# Patient Record
Sex: MALE | Race: WHITE | NOT HISPANIC OR LATINO | Employment: OTHER | URBAN - METROPOLITAN AREA
[De-identification: names, ages, dates, MRNs, and addresses within clinical notes are randomized per-mention and may not be internally consistent; named-entity substitution may affect disease eponyms.]

---

## 2017-05-01 ENCOUNTER — HISTORICAL (OUTPATIENT)
Dept: ADMINISTRATIVE | Facility: HOSPITAL | Age: 27
End: 2017-05-01

## 2017-10-03 ENCOUNTER — HISTORICAL (OUTPATIENT)
Dept: ADMINISTRATIVE | Facility: HOSPITAL | Age: 27
End: 2017-10-03

## 2017-10-03 LAB
ABS NEUT (OLG): 1.26 X10(3)/MCL (ref 2.1–9.2)
ALBUMIN SERPL-MCNC: 4 GM/DL (ref 3.4–5)
ALBUMIN/GLOB SERPL: 1 {RATIO}
ALP SERPL-CCNC: 36 UNIT/L (ref 50–136)
ALT SERPL-CCNC: 21 UNIT/L (ref 12–78)
AST SERPL-CCNC: 13 UNIT/L (ref 15–37)
BILIRUB SERPL-MCNC: 0.4 MG/DL (ref 0.2–1)
BILIRUBIN DIRECT+TOT PNL SERPL-MCNC: 0.1 MG/DL (ref 0–0.2)
BILIRUBIN DIRECT+TOT PNL SERPL-MCNC: 0.3 MG/DL (ref 0–0.8)
BUN SERPL-MCNC: 6 MG/DL (ref 7–18)
CALCIUM SERPL-MCNC: 9.1 MG/DL (ref 8.5–10.1)
CHLORIDE SERPL-SCNC: 104 MMOL/L (ref 98–107)
CHOLEST SERPL-MCNC: 164 MG/DL (ref 0–200)
CHOLEST/HDLC SERPL: 4.2 {RATIO} (ref 0–5)
CO2 SERPL-SCNC: 27 MMOL/L (ref 21–32)
CREAT SERPL-MCNC: 0.77 MG/DL (ref 0.7–1.3)
EOSINOPHIL NFR BLD MANUAL: 1 % (ref 0–8)
ERYTHROCYTE [DISTWIDTH] IN BLOOD BY AUTOMATED COUNT: 12 % (ref 11.5–17)
GLOBULIN SER-MCNC: 4 GM/DL (ref 2.4–3.5)
GLUCOSE SERPL-MCNC: 86 MG/DL (ref 74–106)
HCT VFR BLD AUTO: 43.5 % (ref 42–52)
HDLC SERPL-MCNC: 39 MG/DL (ref 35–60)
HGB BLD-MCNC: 14.7 GM/DL (ref 14–18)
LDLC SERPL CALC-MCNC: 82 MG/DL (ref 0–129)
LYMPHOCYTES NFR BLD MANUAL: 2 %
LYMPHOCYTES NFR BLD MANUAL: 50 % (ref 13–40)
MCH RBC QN AUTO: 29.8 PG (ref 27–31)
MCHC RBC AUTO-ENTMCNC: 33.8 GM/DL (ref 33–36)
MCV RBC AUTO: 88.1 FL (ref 80–94)
MONOCYTES NFR BLD MANUAL: 11 % (ref 2–11)
NEUTROPHILS NFR BLD MANUAL: 36 % (ref 47–80)
PLATELET # BLD AUTO: 170 X10(3)/MCL (ref 130–400)
PLATELET # BLD EST: NORMAL 10*3/UL
PMV BLD AUTO: 8.9 FL (ref 7.4–10.4)
POTASSIUM SERPL-SCNC: 4.6 MMOL/L (ref 3.5–5.1)
PROT SERPL-MCNC: 8 GM/DL (ref 6.4–8.2)
RBC # BLD AUTO: 4.94 X10(6)/MCL (ref 4.7–6.1)
SODIUM SERPL-SCNC: 139 MMOL/L (ref 136–145)
T3FREE SERPL-MCNC: 3.29 PG/ML (ref 2.18–3.98)
T4 FREE SERPL-MCNC: 1 NG/DL (ref 0.76–1.46)
TRIGL SERPL-MCNC: 217 MG/DL (ref 30–150)
TSH SERPL-ACNC: 3.58 MIU/ML (ref 0.36–3.74)
VLDLC SERPL CALC-MCNC: 43 MG/DL
WBC # SPEC AUTO: 3.9 X10(3)/MCL (ref 4.5–11.5)

## 2018-01-10 ENCOUNTER — HOSPITAL ENCOUNTER (EMERGENCY)
Dept: HOSPITAL 25 - UCEAST | Age: 28
Discharge: HOME | End: 2018-01-10
Payer: COMMERCIAL

## 2018-01-10 VITALS — DIASTOLIC BLOOD PRESSURE: 77 MMHG | SYSTOLIC BLOOD PRESSURE: 140 MMHG

## 2018-01-10 DIAGNOSIS — J40: Primary | ICD-10-CM

## 2018-01-10 DIAGNOSIS — Z88.0: ICD-10-CM

## 2018-01-10 DIAGNOSIS — Z77.22: ICD-10-CM

## 2018-01-10 DIAGNOSIS — Z11.4: ICD-10-CM

## 2018-01-10 DIAGNOSIS — Z11.3: ICD-10-CM

## 2018-01-10 PROCEDURE — 87340 HEPATITIS B SURFACE AG IA: CPT

## 2018-01-10 PROCEDURE — 87491 CHLMYD TRACH DNA AMP PROBE: CPT

## 2018-01-10 PROCEDURE — 36415 COLL VENOUS BLD VENIPUNCTURE: CPT

## 2018-01-10 PROCEDURE — 86592 SYPHILIS TEST NON-TREP QUAL: CPT

## 2018-01-10 PROCEDURE — G0463 HOSPITAL OUTPT CLINIC VISIT: HCPCS

## 2018-01-10 PROCEDURE — 99212 OFFICE O/P EST SF 10 MIN: CPT

## 2018-01-10 PROCEDURE — 86706 HEP B SURFACE ANTIBODY: CPT

## 2018-01-10 PROCEDURE — 86703 HIV-1/HIV-2 1 RESULT ANTBDY: CPT

## 2018-01-10 PROCEDURE — 87591 N.GONORRHOEAE DNA AMP PROB: CPT

## 2018-01-10 PROCEDURE — 81003 URINALYSIS AUTO W/O SCOPE: CPT

## 2018-01-10 PROCEDURE — 86803 HEPATITIS C AB TEST: CPT

## 2018-01-10 NOTE — UC
Respiratory Complaint HPI





- HPI Summary


HPI Summary: 





chest burning with cough and inspiration for 2 days, no fevers chills nausea 

vomiting or SOB. Is also interested in STD testing





- History of Current Complaint


Chief Complaint: UCRespiratory


Stated Complaint: RESP COMPLAINT


Time Seen by Provider: 01/10/18 17:45


Hx Obtained From: Patient


Onset/Duration: Sudden Onset, Lasting Days - 2, Still Present


Timing: Constant


Severity Initially: Mild


Severity Currently: Mild


Character: Cough: Nonproductive


Aggravating Factors: Deep Breaths


Alleviating Factors: Nothing


Associated Signs And Symptoms: Positive: Pleuritic Chest Pain, URI





- Allergies/Home Medications


Allergies/Adverse Reactions: 


 Allergies











Allergy/AdvReac Type Severity Reaction Status Date / Time


 


Penicillins Allergy Severe Anaphylatic Verified 01/10/18 16:44





   Shock  














PMH/Surg Hx/FS Hx/Imm Hx


Previously Healthy: Yes


Other History Of: 


   Negative For: HIV, Hepatitis B, Hepatitis C





- Surgical History


Surgical History: Yes


Surgery Procedure, Year, and Place: tubes in ears; testicular surgery





- Family History


Known Family History: Positive: None


   Negative: Cardiac Disease, Hypertension





- Social History


Occupation: Employed Full-time


Lives: With Family


Alcohol Use: None


Substance Use Type: None


Smoking Status (MU): Never Smoked Tobacco


Household Exposure Type: Cigarettes





- Immunization History


Most Recent Influenza Vaccination: 2015


Most Recent Tetanus Shot: UTD





Review of Systems


Constitutional: Negative


Skin: Negative


Eyes: Negative


ENT: Negative


Respiratory: Cough


Cardiovascular: Negative


Gastrointestinal: Negative


Genitourinary: Negative


Motor: Negative


Neurovascular: Negative


Musculoskeletal: Negative


Neurological: Negative


Psychological: Negative


Is Patient Immunocompromised?: No


All Other Systems Reviewed And Are Negative: Yes





Physical Exam


Triage Information Reviewed: Yes


Appearance: Well-Appearing, No Pain Distress, Well-Nourished


Vital Signs: 


 Initial Vital Signs











Temp  98.6 F   01/10/18 16:38


 


Pulse  104   01/10/18 16:38


 


Resp  15   01/10/18 16:38


 


BP  140/77   01/10/18 16:38


 


Pulse Ox  99   01/10/18 16:38











Vital Signs Reviewed: Yes


Eye Exam: Normal


Eyes: Positive: Conjunctiva Clear


ENT Exam: Normal


ENT: Positive: Normal ENT inspection, Hearing grossly normal, Pharynx normal, 

TMs normal, Uvula midline.  Negative: Nasal congestion, Nasal drainage, 

Tonsillar swelling, Tonsillar exudate, Trismus, Muffled voice, Hoarse voice, 

Dental tenderness, Sinus tenderness


Dental Exam: Normal


Neck exam: Normal


Neck: Positive: Supple, Nontender, No Lymphadenopathy


Respiratory Exam: Normal


Respiratory: Positive: Chest non-tender, Lungs clear, Normal breath sounds, No 

respiratory distress, No accessory muscle use


Cardiovascular Exam: Normal


Cardiovascular: Positive: RRR, No Murmur, Pulses Normal, Brisk Capillary Refill


Musculoskeletal Exam: Normal


Musculoskeletal: Positive: Strength Intact, ROM Intact, No Edema


Neurological Exam: Normal


Neurological: Positive: Alert, Muscle Tone Normal


Psychological Exam: Normal


Skin Exam: Normal





UC Diagnostic Evaluation





- Laboratory


O2 Sat by Pulse Oximetry: 99





Respiratory Course/Dx





- Course


Course Of Treatment: Lab studies for STD screening, albuterol, zithromax and 

prednisone for Bronchitis, follow with pcp





- Differential Dx/Diagnosis


Provider Diagnoses: Bronchitis, STD screening





Discharge





- Discharge Plan


Condition: Stable


Disposition: HOME


Prescriptions: 


Albuterol HFA INHALER* [Ventolin HFA Inhaler*] 2 puff INH Q4H PRN #1 mdi


 PRN Reason: cough/chest tightness


Azithromycin TAB* [Zithromax TAB (Z-HASMUKH) 250 mg #6 tabs] 2 tab PO .TODAY, THEN 

1 DAILY #1 hasmukh


predniSONE TAB* [Deltasone TAB*] 50 mg PO DAILY #4 tab


Patient Education Materials:  Sexually Transmitted Diseases (ED), Acute 

Bronchitis (ED), Hypertension (ED)


Referrals: 


Bart Rogel MD [Primary Care Provider] - 1 Week

## 2018-01-12 NOTE — UC
- Progress Note


Progress Note: 





please call patient--notify patient he is positive for Chlamydia   will be 

treated with a one time dose of 1,000 mg of Zithromax--he must take all at once-

--no sexual contact for 10 days, any other he has had sexual contact with 

should also be treated---We can provide expedited partner treatment--But I 

cannot call them in he must come up here to get the scripts please find out how 

many he will need please---Meds have been called in to pharmacy documented





Course/Dx





- Course


Course Of Treatment: Lab studies for STD screening, albuterol, zithromax and 

prednisone for Bronchitis, follow with pcp

## 2018-01-18 ENCOUNTER — HOSPITAL ENCOUNTER (EMERGENCY)
Dept: HOSPITAL 25 - UCEAST | Age: 28
Discharge: HOME | End: 2018-01-18
Payer: MEDICAID

## 2018-01-18 VITALS — SYSTOLIC BLOOD PRESSURE: 146 MMHG | DIASTOLIC BLOOD PRESSURE: 86 MMHG

## 2018-01-18 DIAGNOSIS — S05.02XA: Primary | ICD-10-CM
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
## 2018-01-18 PROCEDURE — G0463 HOSPITAL OUTPT CLINIC VISIT: HCPCS

## 2018-01-18 PROCEDURE — 99213 OFFICE O/P EST LOW 20 MIN: CPT

## 2018-01-18 NOTE — UC
Eye Complaint HPI





- HPI Summary


HPI Summary: 





28 yo male awoke this am and noted left eye redness and photophobia


tearing


no purlent d/c


no trauma


lower lid FB sensation





- History of Current Complaint


Chief Complaint: UCEye


Stated Complaint: EYE COMPLAINT


Time Seen by Provider: 01/18/18 21:24


Hx Obtained From: Patient


Onset/Duration: Gradual Onset, Lasting Hours


Timing: Constant


Severity Initially: Moderate


Severity Currently: Moderate


Pain Intensity: 6


Pain Scale Used: 0-10 Numeric


Location of Injury: Conjunctiva


Associated Signs And Symptoms: Positive: Photophobia, Drainage (Clear)


Eyes: 


  __________________________














  __________________________





 1 - corneal abrasion








- Risk Factors


Penetrating Injury Risk Factor: Negative


Globe Rupture Risk Factors: Negative


Acute Glaucoma Risk Factors: Negative


Optic Artery Occlusion Risk Factors: Negative





- Allergies/Home Medications


Allergies/Adverse Reactions: 


 Allergies











Allergy/AdvReac Type Severity Reaction Status Date / Time


 


Penicillins Allergy Severe Anaphylatic Verified 01/18/18 20:39





   Shock  














PMH/Surg Hx/FS Hx/Imm Hx


Previously Healthy: Yes


Other History Of: 


   Negative For: HIV, Hepatitis B, Hepatitis C





- Surgical History


Surgical History: Yes


Surgery Procedure, Year, and Place: tubes in ears; testicular surgery





- Family History


Known Family History: 


   Negative: Cardiac Disease, Hypertension





- Social History


Alcohol Use: None


Substance Use Type: None


Smoking Status (MU): Never Smoked Tobacco


Household Exposure Type: Cigarettes





- Immunization History


Most Recent Influenza Vaccination: 2015


Most Recent Tetanus Shot: UTD





Review of Systems


Constitutional: Negative


Skin: Negative


Eyes: Eye Redness, Photophobia


ENT: Negative


Respiratory: Negative


Cardiovascular: Negative


Gastrointestinal: Negative


Genitourinary: Negative


Motor: Negative


Neurovascular: Negative


Musculoskeletal: Negative


Neurological: Negative


Psychological: Negative


Is Patient Immunocompromised?: No


All Other Systems Reviewed And Are Negative: Yes





Physical Exam


Triage Information Reviewed: Yes


Appearance: Well-Appearing, No Pain Distress, Well-Nourished


Vital Signs: 


 Initial Vital Signs











Temp  97.7 F   01/18/18 20:35


 


Pulse  93   01/18/18 20:35


 


Resp  16   01/18/18 20:35


 


BP  146/86   01/18/18 20:35


 


Pulse Ox  100   01/18/18 20:35











Eyes: Positive: Conjunctiva Inflamed, Other: - eomi/perrl/(+) florescein 

staining defect as noted.  Negative: Discharge


Neck: Positive: Supple, Nontender


Respiratory: Positive: Lungs clear, Normal breath sounds, No respiratory 

distress


Cardiovascular: Positive: RRR, No Murmur


Musculoskeletal: Positive: ROM Intact, No Edema


Neurological: Positive: Alert


Psychological Exam: Normal


Skin Exam: Normal





Eye Complaint Course/Dx





- Differential Dx/Diagnosis


Provider Diagnoses: corneal abrasion left eye





Discharge





- Discharge Plan


Condition: Stable


Disposition: HOME


Prescriptions: 


Ibuprofen TAB* [Motrin TAB*] 600 mg PO Q6H PRN #40 tab


 PRN Reason: Pain


Patient Education Materials:  Corneal Abrasion (ED)


Referrals: 


Yoseph Riddle MD [Medical Doctor] - 1 Day


Additional Instructions: 


I suggest recheck tomorrow





If unable to get in to see Dr. Riddle we are happy to recheck you





You have a good sized abrasion and we want to make sure it heals





use antibiotic drops as prescribed

## 2018-02-21 ENCOUNTER — HISTORICAL (OUTPATIENT)
Dept: ADMINISTRATIVE | Facility: HOSPITAL | Age: 28
End: 2018-02-21

## 2018-05-11 ENCOUNTER — HOSPITAL ENCOUNTER (EMERGENCY)
Dept: HOSPITAL 25 - ED | Age: 28
Discharge: HOME | End: 2018-05-11
Payer: MEDICAID

## 2018-05-11 VITALS — DIASTOLIC BLOOD PRESSURE: 77 MMHG | SYSTOLIC BLOOD PRESSURE: 110 MMHG

## 2018-05-11 DIAGNOSIS — J45.909: ICD-10-CM

## 2018-05-11 DIAGNOSIS — R11.2: ICD-10-CM

## 2018-05-11 DIAGNOSIS — Z88.0: ICD-10-CM

## 2018-05-11 DIAGNOSIS — R10.11: Primary | ICD-10-CM

## 2018-05-11 LAB
BASOPHILS # BLD AUTO: 0 10^3/UL (ref 0–0.2)
EOSINOPHIL # BLD AUTO: 0.1 10^3/UL (ref 0–0.6)
HCT VFR BLD AUTO: 43 % (ref 42–52)
HGB BLD-MCNC: 15 G/DL (ref 14–18)
LYMPHOCYTES # BLD AUTO: 1.4 10^3/UL (ref 1–4.8)
MCH RBC QN AUTO: 31 PG (ref 27–31)
MCHC RBC AUTO-ENTMCNC: 35 G/DL (ref 31–36)
MCV RBC AUTO: 89 FL (ref 80–94)
MONOCYTES # BLD AUTO: 0.9 10^3/UL (ref 0–0.8)
NEUTROPHILS # BLD AUTO: 3.1 10^3/UL (ref 1.5–7.7)
NRBC # BLD AUTO: 0 10^3/UL
NRBC BLD QL AUTO: 0.1
PLATELET # BLD AUTO: 225 10^3/UL (ref 150–450)
RBC # BLD AUTO: 4.85 10^6/UL (ref 4–5.4)
WBC # BLD AUTO: 5.6 10^3/UL (ref 3.5–10.8)

## 2018-05-11 PROCEDURE — 96374 THER/PROPH/DIAG INJ IV PUSH: CPT

## 2018-05-11 PROCEDURE — 99284 EMERGENCY DEPT VISIT MOD MDM: CPT

## 2018-05-11 PROCEDURE — 96375 TX/PRO/DX INJ NEW DRUG ADDON: CPT

## 2018-05-11 PROCEDURE — 81003 URINALYSIS AUTO W/O SCOPE: CPT

## 2018-05-11 PROCEDURE — 83690 ASSAY OF LIPASE: CPT

## 2018-05-11 PROCEDURE — 85025 COMPLETE CBC W/AUTO DIFF WBC: CPT

## 2018-05-11 PROCEDURE — 80053 COMPREHEN METABOLIC PANEL: CPT

## 2018-05-11 PROCEDURE — 86140 C-REACTIVE PROTEIN: CPT

## 2018-05-11 PROCEDURE — 83605 ASSAY OF LACTIC ACID: CPT

## 2018-05-11 PROCEDURE — 74177 CT ABD & PELVIS W/CONTRAST: CPT

## 2018-05-11 PROCEDURE — 36415 COLL VENOUS BLD VENIPUNCTURE: CPT

## 2018-05-11 NOTE — RAD
INDICATION:  Abdominal pain.



COMPARISON:  Comparison is made with prior CT studies of the abdomen from October 17, 2014

and August 29, 2016



TECHNIQUE: A CT scan of the abdomen and pelvis was performed with intravenous and oral

contrast following intravenous injection of 150 ml of  Omnipaque 300 nonionic contrast.

Contiguous axial sections were obtained from the lung bases through the symphysis pubis.

Images were reconstructed in the coronal and sagittal planes.



FINDINGS:  There is mild dependent bilateral lower lobe subsegmental atelectasis.  No

pleural effusion is present.



The liver and spleen are within normal limits in size without significant focal

abnormality. No calcified gallstones are seen. The pancreas appears to be within normal

limits in size.



The kidneys and adrenal glands are normal in size. No hydronephrosis is seen.  No

significant focal renal abnormality is seen.



The aorta is normal in caliber and demonstrates homogeneous contrast opacification.



No significant enlarged retroperitoneal lymph nodes are seen.



The stomach, small and large bowel appear nondistended.  The appendix is within normal

limits.  There is mild descending and sigmoid diverticulosis without evidence for

diverticulitis.



No free intraperitoneal air or fluid is seen.



No significant focal osseous abnormality is seen.



IMPRESSION:  NO EVIDENCE FOR ACUTE INTRA-ABDOMINAL ABNORMALITY OR CAUSE FOR THE PATIENT'S

ABDOMINAL PAIN IS SEEN.

## 2018-05-11 NOTE — ED
Magdalene WAGNER Rebecca, scribed for Latha Addison MD on 05/11/18 at 0331 .





Abdominal Pain/Male





- HPI Summary


HPI Summary: 


Pt is a 28 y/o M who presents to ED c/o abdominal pain. Sx have been present 

for 3 days, worse since 0100 this morning. Pain is located on the right side 

and is characterized as burning. Pain is currently moderate, ranked 5/10. Sx 

aggravated by cough, alleviated by nothing. Unsure if symptoms are brought on 

by eating. Additionally c/o N/V. Denies dysuria, fever, diarrhea. The ride here 

was not particularly painful. Last BM was at 1800 and normal.  No PSHx on the 

abdomen. 





- History of Current Complaint


Chief Complaint: EDAbdPain


Stated Complaint: VOMITING


Time Seen by Provider: 05/11/18 03:04


Hx Obtained From: Patient


Onset/Duration: Lasting Days - 3 days, Still Present


Severity Currently: Moderate


Pain Intensity: 5


Pain Scale Used: 0-10 Numeric


Location: Other - Right side


Character: Burning


Aggravating Factor(s): Other: - Cough


Alleviating Factor(s): Nothing


Associated Signs And Symptoms: Positive: Nausea, Vomiting.  Negative: Fever, 

Urinary Symptoms, Diarrhea





- Allergies/Home Medications


Allergies/Adverse Reactions: 


 Allergies











Allergy/AdvReac Type Severity Reaction Status Date / Time


 


Penicillins Allergy  Anaphylatic Verified 05/11/18 02:51





   Shock  














PMH/Surg Hx/FS Hx/Imm Hx


Endocrine/Hematology History: 


   Denies: Hx Diabetes


Cardiovascular History: 


   Denies: Hx Congestive Heart Failure, Hx Hypertension


Respiratory History: Reports: Hx Asthma - childhood


 History: 


   Denies: Hx Renal Disease





- Surgical History


Surgery Procedure, Year, and Place: tubes in ears; testicular surgery


Infectious Disease History: No


Infectious Disease History: 


   Denies: History Other Infectious Disease, Traveled Outside the [place] in Last 30 

Days





- Family History


Known Family History: 


   Negative: Cardiac Disease, Hypertension





- Social History


Alcohol Use: None


Substance Use Type: Reports: None


Smoking Status (MU): Never Smoked Tobacco





Review of Systems


Negative: Fever


Positive: Abdominal Pain, Vomiting, Nausea.  Negative: Diarrhea


Negative: dysuria


All Other Systems Reviewed And Are Negative: Yes





Physical Exam





- Summary


Physical Exam Summary: 


GENERAL: ~Patient is a well developed and nourished M who is lying comfortable 

in the stretcher. ~Patient is not in any acute respiratory distress.


HEAD AND FACE: Normocephalic


EYES: PERRLA, EOMI x 2.


EARS: Hearing grossly intact.


MOUTH: Oropharynx within normal limits.


NECK: Supple, trachea is midline, no adenopathy, no JVD, no carotid bruit.


CHEST: Symmetric, no tenderness at palpation


LUNGS: Clear to auscultation bilaterally. No wheezing or crackles.


CVS: Regular rate and rhythm, S1 and S2 present, no murmurs or gallops 

appreciated.


ABDOMEN: Soft, slight tenderness to palpation on the right side. There is no 

area that is worse. Voluntary guarding but no rebound. Bowel sounds are normal. 

No abdominal abnormal pulsations.


EXTREMITIES: Full ROM in all major joints, no edema, no cyanosis or clubbing.


NEURO: Alert and oriented x 3. No acute neurological deficits. Speech is normal 

and follows commands.


SKIN: Dry and warm





Triage Information Reviewed: Yes


Vital Signs On Initial Exam: 


 Initial Vitals











Temp Pulse Resp BP Pulse Ox


 


 98.0 F   85   16   145/84   99 


 


 05/11/18 02:49  05/11/18 02:49  05/11/18 02:49  05/11/18 02:49  05/11/18 02:49











Vital Signs Reviewed: Yes





Diagnostics





- Vital Signs


 Vital Signs











  Temp Pulse Resp BP Pulse Ox


 


 05/11/18 03:06   89   136/92  96


 


 05/11/18 03:05   86    97


 


 05/11/18 02:49  98.0 F  85  16  145/84  99














- Laboratory


Result Diagrams: 


 05/11/18 03:18





 05/11/18 03:18


Lab Statement: Any lab studies that have been ordered have been reviewed, and 

results considered in the medical decision making process.





- CT


  ** CT Abd/Pel


CT Interpretation: No Acute Changes - No acute findings. ED physician reviewed 

this radiology report.


CT Interpretation Completed By: Radiologist





Re-Evaluation





- Re-Evaluation


  ** First Eval


Re-Evaluation Time: 06:37


Change: Improved


Comment: The pain is completely gone.





Abdominal Pain Fem Course/Dx





- Course


Assessment/Plan: Pt is a 28 y/o M who presents to ED c/o abdominal pain with N/

V. His workup is unremarkable. CT Abd/Pel reveals no acute pathology. He was 

given IV Toradol and IV Reglan and his symptoms significantly improved. He is 

now completely pain free. He wis hemodynamically stable and will be discharged 

to home with return precautions.





- Diagnoses


Provider Diagnoses: 


 Abdominal pain








Discharge





- Sign-Out/Discharge


Documenting (check all that apply): Discharge/Admit/Transfer - Discharge





- Discharge Plan


Condition: Stable


Disposition: HOME


Patient Education Materials:  Acute Abdominal Pain (ED)


Referrals: 


Bart Rogel MD [Primary Care Provider] - 3 Days


Additional Instructions: 


RETURN TO ED FOR ANY NEW OR RETURNING SYMPTOMS. 





The documentation as recorded by the Magdalene adorno Rebecca accurately 

reflects the service I personally performed and the decisions made by Azael anthony Tudie-Ann, MD.

## 2018-07-24 ENCOUNTER — HISTORICAL (OUTPATIENT)
Dept: ADMINISTRATIVE | Facility: HOSPITAL | Age: 28
End: 2018-07-24

## 2019-01-07 ENCOUNTER — HOSPITAL ENCOUNTER (EMERGENCY)
Dept: HOSPITAL 25 - UCEAST | Age: 29
Discharge: HOME | End: 2019-01-07
Payer: MEDICAID

## 2019-01-07 VITALS — DIASTOLIC BLOOD PRESSURE: 102 MMHG | SYSTOLIC BLOOD PRESSURE: 152 MMHG
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
## 2019-01-07 DIAGNOSIS — Z88.0: ICD-10-CM

## 2019-01-07 DIAGNOSIS — S05.02XA: Primary | ICD-10-CM
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
## 2019-01-07 PROCEDURE — 99212 OFFICE O/P EST SF 10 MIN: CPT

## 2019-01-07 PROCEDURE — G0463 HOSPITAL OUTPT CLINIC VISIT: HCPCS

## 2019-01-07 NOTE — UC
Eye Complaint HPI





- HPI Summary


HPI Summary: 





29 yo male presents with LEFT eye FB sensation and pain. He does not recall 

getting any FB into his eye or injury. Says he had something like this last 

year and it was a "corneal scratch". He wears glasses, but no contacts. No 

recent illness.





- History of Current Complaint


Chief Complaint: UCEye


Stated Complaint: EYE COMPLAINT


Time Seen by Provider: 01/07/19 10:29


Hx Obtained From: Patient


Onset/Duration: Sudden Onset


Timing: Constant


Severity Initially: Mild


Severity Currently: Mild


Pain Intensity: 3


Pain Scale Used: 0-10 Numeric





- Allergies/Home Medications


Allergies/Adverse Reactions: 


 Allergies











Allergy/AdvReac Type Severity Reaction Status Date / Time


 


Penicillins Allergy  Anaphylatic Verified 01/07/19 10:29





   Shock  














PMH/Surg Hx/FS Hx/Imm Hx





- Additional Past Medical History


Additional PMH: 





None


Other History Of: 


   Negative For: HIV, Hepatitis B, Hepatitis C





- Surgical History


Surgical History: Yes


Surgery Procedure, Year, and Place: tubes in ears; testicular surgery





- Family History


Known Family History: Positive: None


   Negative: Cardiac Disease, Hypertension





- Social History


Alcohol Use: None


Substance Use Type: None


Smoking Status (MU): Never Smoked Tobacco


Household Exposure Type: Cigarettes





- Immunization History


Most Recent Influenza Vaccination: 2015


Most Recent Tetanus Shot: UTD





Review of Systems


All Other Systems Reviewed And Are Negative: Yes


Constitutional: Positive: Negative


Skin: Positive: Negative


Eyes: Positive: Eye Redness, Other - FB sensation left eye


ENT: Positive: Negative


Respiratory: Positive: Negative


Cardiovascular: Positive: Negative


Neurological: Positive: Negative


Psychological: Positive: Negative





Physical Exam





- Summary


Physical Exam Summary: 





GENERAL: WDWN. No pain distress.


SKIN: No rashes, sores, lesions, or open wounds.


HEENT:


            Head: AT/NC


            Eyes: EOM intact. PERRLA. LEFT EYE: Mild scleral injection. 

Conjunctiva without erythema or inflammation. Fluorescein exam: 2mm linear 

corneal abrasion at 9 o'clock position. No esteban sign. No FB appreciated. 

RIGHT EYE: Conjunctiva clear without inflammation or discharge. No FBs 

appreciated


            Nose: NTTP maxillary and frontal sinus. 


NECK: Supple. Nontender. No lymphadenopathy. 


CHEST:  No accessory muscle use. Breathing comfortably and in no distress.


CV:  Pulses intact. Cap refill <2seconds


NEURO: Alert.


PSYCH: Age appropriate behavior.





Triage Information Reviewed: Yes


Vital Signs: 


 Initial Vital Signs











Temp  96.7 F   01/07/19 10:27


 


Pulse  84   01/07/19 10:27


 


Resp  18   01/07/19 10:27


 


BP  152/102   01/07/19 10:27


 


Pulse Ox  100   01/07/19 10:27











Vital Signs Reviewed: Yes





Eye Complaint Course/Dx





- Course


Course Of Treatment: Corneal abrasion left eye.





- Differential Dx/Diagnosis


Provider Diagnosis: 


 Corneal abrasion, left








Discharge





- Sign-Out/Discharge


Documenting (check all that apply): Patient Departure


All imaging exams completed and their final reports reviewed: No Studies





- Discharge Plan


Condition: Stable


Disposition: HOME


Prescriptions: 


Ofloxacin 0.3% (Eye Drop) [Ocuflox OPTH 0.3% (Eye Drop)] 1 - 2 drop LEFT EYE 

QID #1 [id number]


Patient Education Materials:  Corneal Abrasion (ED)


Forms:  *Work Release


Referrals: 


Jamee Simmons PA [Primary Care Provider] - 


Yoseph Riddle MD [Medical Doctor] - If Needed


Additional Instructions: 


If you develop a fever, shortness of breath, chest pain, new or worsening 

symptoms - please call your PCP or go to the ED.


 





Your blood pressure was high at todays visit. Please see your primary provider 

within 4 weeks for recheck and re-evaluation.








If your eye does not improve in the next day or two - please call Dr. Riddle at 

the number below to schedule a follow up appointment





- Billing Disposition and Condition


Condition: STABLE


Disposition: Home

## 2019-01-09 ENCOUNTER — HISTORICAL (OUTPATIENT)
Dept: ADMINISTRATIVE | Facility: HOSPITAL | Age: 29
End: 2019-01-09

## 2019-02-13 ENCOUNTER — HOSPITAL ENCOUNTER (EMERGENCY)
Dept: HOSPITAL 25 - ED | Age: 29
Discharge: HOME | End: 2019-02-13
Payer: COMMERCIAL

## 2019-02-13 DIAGNOSIS — J32.9: ICD-10-CM

## 2019-02-13 DIAGNOSIS — Z88.0: ICD-10-CM

## 2019-02-13 DIAGNOSIS — J02.9: Primary | ICD-10-CM

## 2019-02-13 LAB
ALBUMIN SERPL BCG-MCNC: 4.6 G/DL (ref 3.2–5.2)
ALBUMIN/GLOB SERPL: 1.4 {RATIO} (ref 1–3)
ALP SERPL-CCNC: 70 U/L (ref 34–104)
ALT SERPL W P-5'-P-CCNC: 26 U/L (ref 7–52)
AMYLASE SERPL-CCNC: 16 U/L (ref 29–103)
ANION GAP SERPL CALC-SCNC: 7 MMOL/L (ref 2–11)
AST SERPL-CCNC: 14 U/L (ref 13–39)
BASOPHILS # BLD AUTO: 0.1 10^3/UL (ref 0–0.2)
BUN SERPL-MCNC: 14 MG/DL (ref 6–24)
BUN/CREAT SERPL: 13.1 (ref 8–20)
CALCIUM SERPL-MCNC: 9.8 MG/DL (ref 8.6–10.3)
CHLORIDE SERPL-SCNC: 102 MMOL/L (ref 101–111)
EOSINOPHIL # BLD AUTO: 0.1 10^3/UL (ref 0–0.6)
FLUAV RNA SPEC QL NAA+PROBE: NEGATIVE
FLUBV RNA SPEC QL NAA+PROBE: NEGATIVE
GLOBULIN SER CALC-MCNC: 3.2 G/DL (ref 2–4)
GLUCOSE SERPL-MCNC: 106 MG/DL (ref 70–100)
HCO3 SERPL-SCNC: 28 MMOL/L (ref 22–32)
HCT VFR BLD AUTO: 44 % (ref 42–52)
HGB BLD-MCNC: 14.8 G/DL (ref 14–18)
INR PPP/BLD: 1.03 (ref 0.77–1.02)
LYMPHOCYTES # BLD AUTO: 1.7 10^3/UL (ref 1–4.8)
MCH RBC QN AUTO: 30 PG (ref 27–31)
MCHC RBC AUTO-ENTMCNC: 34 G/DL (ref 31–36)
MCV RBC AUTO: 89 FL (ref 80–94)
MONOCYTES # BLD AUTO: 1.8 10^3/UL (ref 0–0.8)
NEUTROPHILS # BLD AUTO: 10.6 10^3/UL (ref 1.5–7.7)
NRBC # BLD AUTO: 0 10^3/UL
NRBC BLD QL AUTO: 0
PLATELET # BLD AUTO: 225 10^3/UL (ref 150–450)
POTASSIUM SERPL-SCNC: 4 MMOL/L (ref 3.5–5)
PROT SERPL-MCNC: 7.8 G/DL (ref 6.4–8.9)
RBC # BLD AUTO: 4.95 10^6/UL (ref 4–5.4)
SODIUM SERPL-SCNC: 137 MMOL/L (ref 135–145)
WBC # BLD AUTO: 14.4 10^3/UL (ref 3.5–10.8)

## 2019-02-13 PROCEDURE — 87651 STREP A DNA AMP PROBE: CPT

## 2019-02-13 PROCEDURE — 99283 EMERGENCY DEPT VISIT LOW MDM: CPT

## 2019-02-13 PROCEDURE — 36415 COLL VENOUS BLD VENIPUNCTURE: CPT

## 2019-02-13 PROCEDURE — 85025 COMPLETE CBC W/AUTO DIFF WBC: CPT

## 2019-02-13 PROCEDURE — 85610 PROTHROMBIN TIME: CPT

## 2019-02-13 PROCEDURE — 96374 THER/PROPH/DIAG INJ IV PUSH: CPT

## 2019-02-13 PROCEDURE — 86140 C-REACTIVE PROTEIN: CPT

## 2019-02-13 PROCEDURE — 80053 COMPREHEN METABOLIC PANEL: CPT

## 2019-02-13 PROCEDURE — 82150 ASSAY OF AMYLASE: CPT

## 2019-02-13 PROCEDURE — 83605 ASSAY OF LACTIC ACID: CPT

## 2019-02-13 PROCEDURE — 83690 ASSAY OF LIPASE: CPT

## 2019-02-13 PROCEDURE — 87040 BLOOD CULTURE FOR BACTERIA: CPT

## 2019-02-13 NOTE — ED
GI/ HPI





- HPI Summary


HPI Summary: 


This patient is a 28 year old M presenting to [facility] accompanied by family with 

a chief complaint of nausea and vomiting that began earlier today. The patient 

rates the pain 0/10 in severity. Symptoms aggravated by nothing. Symptoms 

alleviated by nothing. Patient reports fever (102.7F), epigastric abd pain, and 

sore throat. Patient denies diarrhea.








- History of Current Complaint


Chief Complaint: EDNauseaVomitDiarrh


Time Seen by Provider: 02/13/19 21:57


Stated Complaint: VOMITING/FEVER


Hx Obtained From: Patient


Onset/Duration: Started Hours Ago, Atraumatic, Still Present


Timing: Constant


Severity: Mild


Current Severity: Mild


Pain Intensity: 0


Location of Pain: Epigastric


Associated Signs and Symptoms: Positive: Other: - Positive fever (102.7F), 

epigastric abd pain, and sore throat. Negative diarrhea.


Aggravating Factor(s): Nothing


Alleviating Factor(s): Nothing





- Allergy/Home Medications


Allergies/Adverse Reactions: 


 Allergies











Allergy/AdvReac Type Severity Reaction Status Date / Time


 


Penicillins Allergy  Anaphylatic Verified 02/13/19 20:53





   Shock  














PMH/Surg Hx/FS Hx/Imm Hx


Previously Healthy: No


Endocrine/Hematology History: 


   Denies: Hx Diabetes


Cardiovascular History: 


   Denies: Hx Congestive Heart Failure, Hx Hypertension


Respiratory History: Reports: Hx Asthma - childhood


 History: 


   Denies: Hx Renal Disease





- Surgical History


Surgery Procedure, Year, and Place: tubes in ears; testicular surgery


Infectious Disease History: No


Infectious Disease History: 


   Denies: History Other Infectious Disease, Traveled Outside the [place] in Last 30 

Days





- Family History


Known Family History: 


   Negative: Cardiac Disease, Hypertension





- Social History


Occupation: Employed Full-time


Lives: With Family


Alcohol Use: None


Hx Substance Use: No


Substance Use Type: Reports: None


Hx Tobacco Use: No


Smoking Status (MU): Never Smoked Tobacco





Review of Systems


Positive: Fever


Positive: Sore Throat


Positive: Abdominal Pain, Vomiting, Nausea.  Negative: Diarrhea


All Other Systems Reviewed And Are Negative: Yes





Physical Exam





- Summary


Physical Exam Summary: 


VITAL SIGNS: Reviewed.


GENERAL: Patient is a well-developed and nourished male who is lying 

comfortable in the stretcher. Patient is not in any acute respiratory distress.


HEAD AND FACE: No signs of trauma. No ecchymosis, hematomas or skull 

depressions. No sinus tenderness.


EYES: PERRLA, EOMI x 2, No injected conjunctiva, no nystagmus.


EARS: Hearing grossly intact. Ear canals and tympanic membranes are within 

normal limits.


MOUTH: Oropharynx within normal limits. Pharyngeal hyperemia, no exudate.


NECK: Supple, trachea is midline, no adenopathy, no JVD, no carotid bruit, no c-

spine tenderness, neck with full ROM.


CHEST: Symmetric, no tenderness at palpation


LUNGS: Clear to auscultation bilaterally. No wheezing or crackles.


CVS: Regular rate and rhythm, S1 and S2 present, no murmurs or gallops 

appreciated.


ABDOMEN: Soft, non-tender. No signs of distention. No rebound no guarding, and 

no masses palpated. Bowel sounds are normal.


EXTREMITIES: FROM in all major joints, no edema, no cyanosis or clubbing.


NEURO: Alert and oriented x 3. No acute neurological deficits. Speech is normal 

and follows commands.


SKIN: Dry and warm





Triage Information Reviewed: Yes


Vital Signs On Initial Exam: 


 Initial Vitals











Temp Pulse Resp BP Pulse Ox


 


 99.1 F   113   18   140/95   96 


 


 02/13/19 20:51  02/13/19 20:51  02/13/19 20:51  02/13/19 20:51  02/13/19 20:51











Vital Signs Reviewed: Yes





Diagnostics





- Vital Signs


 Vital Signs











  Temp Pulse Resp BP Pulse Ox


 


 02/13/19 20:51  99.1 F  113  18  140/95  96














- Laboratory


Result Diagrams: 


 02/13/19 22:37





 02/13/19 22:37


Lab Statement: Any lab studies that have been ordered have been reviewed, and 

results considered in the medical decision making process.





Re-Evaluation





- Re-Evaluation


  ** First Eval


Re-Evaluation Time: 23:35


Change: Improved


Comment: Upon re-examination the patient has post nasal drip. The patient 

reports his symptoms have improved.





GIGU Course/Dx





- Course


Course Of Treatment: This patient is a 28 year old M presenting to [facility] 

accompanied by family with a chief complaint of nausea and vomiting that began 

earlier today. Physical Exam Findings: Pharyngeal hyperemia, no exudate. 

Postnasal drip. Bloodwork obtained. In the ED course the patient was given 

acetaminophen, cephalexin, fluids, and Reglan.  Patient will be discharged with 

prescription for Keflex, Motrin, and Reglan and with follow up from PCP. The 

patient is agreeable with this plan.





- Diagnoses


Provider Diagnoses: 


 Pharyngitis, Sinusitis








Discharge





- Sign-Out/Discharge


Documenting (check all that apply): Patient Departure - Discharge home


Patient Received Moderate/Deep Sedation with Procedure: No





- Discharge Plan


Condition: Stable


Disposition: HOME


Prescriptions: 


Cephalexin CAP* [Keflex CAP*] 500 mg PO QID #30 cap


Ibuprofen TAB* [Motrin TAB* 800 MG] 800 mg PO Q6H PRN #30 tab


 PRN Reason: Fever/Pain


Metoclopramide TAB* [Reglan TAB*] 10 mg PO Q6H PRN #20 tab


 PRN Reason: Nausea/Vomiting


Patient Education Materials:  Pharyngitis (ED), Sinusitis (ED)


Forms:  *Work Release


Referrals: 


Jamee Simmons PA [Primary Care Provider] - 2 Days


Additional Instructions: 


RETURN TO THE EMERGENCY DEPARTMENT FOR NEW OR WORSENING SYMPTOMS 





- Attestation Statements


Document Initiated by Scribe: Yes


Documenting Scribe: Tamanna Lyons


Provider For Whom Scribe is Documenting (Include Credential): Dr. Asiya Rios MD


Scribe Attestation: 


Tamanna WAGNER, scribed for Dr. Asiya Rios MD on 02/13/19 at 2340. 


Status of Scribe Document: Ready

## 2019-02-14 VITALS — SYSTOLIC BLOOD PRESSURE: 137 MMHG | DIASTOLIC BLOOD PRESSURE: 87 MMHG

## 2019-08-07 ENCOUNTER — HOSPITAL ENCOUNTER (EMERGENCY)
Dept: HOSPITAL 25 - ED | Age: 29
Discharge: HOME | End: 2019-08-07
Payer: MEDICAID

## 2019-08-07 VITALS — DIASTOLIC BLOOD PRESSURE: 75 MMHG | SYSTOLIC BLOOD PRESSURE: 126 MMHG

## 2019-08-07 DIAGNOSIS — Z88.0: ICD-10-CM

## 2019-08-07 DIAGNOSIS — R10.9: Primary | ICD-10-CM

## 2019-08-07 DIAGNOSIS — R16.0: ICD-10-CM

## 2019-08-07 LAB
ALBUMIN SERPL BCG-MCNC: 4.5 G/DL (ref 3.2–5.2)
ALBUMIN/GLOB SERPL: 1.6 {RATIO} (ref 1–3)
ALP SERPL-CCNC: 62 U/L (ref 34–104)
ALT SERPL W P-5'-P-CCNC: 34 U/L (ref 7–52)
ANION GAP SERPL CALC-SCNC: 7 MMOL/L (ref 2–11)
AST SERPL-CCNC: 22 U/L (ref 13–39)
BASOPHILS # BLD AUTO: 0 10^3/UL (ref 0–0.2)
BUN SERPL-MCNC: 17 MG/DL (ref 6–24)
BUN/CREAT SERPL: 16.7 (ref 8–20)
CALCIUM SERPL-MCNC: 9.9 MG/DL (ref 8.6–10.3)
CHLORIDE SERPL-SCNC: 105 MMOL/L (ref 101–111)
EOSINOPHIL # BLD AUTO: 0.1 10^3/UL (ref 0–0.6)
GLOBULIN SER CALC-MCNC: 2.9 G/DL (ref 2–4)
GLUCOSE SERPL-MCNC: 115 MG/DL (ref 70–100)
HCO3 SERPL-SCNC: 27 MMOL/L (ref 22–32)
HCT VFR BLD AUTO: 45 % (ref 42–52)
HGB BLD-MCNC: 15.2 G/DL (ref 14–18)
LYMPHOCYTES # BLD AUTO: 2.2 10^3/UL (ref 1–4.8)
MCH RBC QN AUTO: 30 PG (ref 27–31)
MCHC RBC AUTO-ENTMCNC: 34 G/DL (ref 31–36)
MCV RBC AUTO: 89 FL (ref 80–94)
MONOCYTES # BLD AUTO: 1.1 10^3/UL (ref 0–0.8)
NEUTROPHILS # BLD AUTO: 5.1 10^3/UL (ref 1.5–7.7)
NRBC # BLD AUTO: 0 10^3/UL
NRBC BLD QL AUTO: 0
PLATELET # BLD AUTO: 248 10^3/UL (ref 150–450)
POTASSIUM SERPL-SCNC: 3.9 MMOL/L (ref 3.5–5)
PROT SERPL-MCNC: 7.4 G/DL (ref 6.4–8.9)
RBC # BLD AUTO: 5.08 10^6 /UL (ref 4.18–5.48)
SODIUM SERPL-SCNC: 139 MMOL/L (ref 135–145)
WBC # BLD AUTO: 8.5 10^3/UL (ref 3.5–10.8)

## 2019-08-07 PROCEDURE — 81003 URINALYSIS AUTO W/O SCOPE: CPT

## 2019-08-07 PROCEDURE — 36415 COLL VENOUS BLD VENIPUNCTURE: CPT

## 2019-08-07 PROCEDURE — 99283 EMERGENCY DEPT VISIT LOW MDM: CPT

## 2019-08-07 PROCEDURE — 83690 ASSAY OF LIPASE: CPT

## 2019-08-07 PROCEDURE — 74176 CT ABD & PELVIS W/O CONTRAST: CPT

## 2019-08-07 PROCEDURE — 83605 ASSAY OF LACTIC ACID: CPT

## 2019-08-07 PROCEDURE — 87040 BLOOD CULTURE FOR BACTERIA: CPT

## 2019-08-07 PROCEDURE — 85025 COMPLETE CBC W/AUTO DIFF WBC: CPT

## 2019-08-07 PROCEDURE — 80053 COMPREHEN METABOLIC PANEL: CPT

## 2019-08-07 PROCEDURE — 86140 C-REACTIVE PROTEIN: CPT

## 2019-08-07 NOTE — ED
Abdominal Pain/Male





- HPI Summary


HPI Summary: 





A 30 y/o male presents to [facility] with a chief complaint of abdominal pain for 

the past three days. He also reports N/V and denies urinary symptoms, SOB or 

CP. Movement, deep breaths or coughing aggravates his pain. Yesterday he had 

his last BM which was normal.





- History of Current Complaint


Chief Complaint: EDAbdPain


Stated Complaint: STOMACH PAIN PER PT


Time Seen by Provider: 08/07/19 03:04


Hx Obtained From: Patient


Onset/Duration: Sudden Onset, Lasting Days, Still Present


Timing: Constant


Severity Initially: Severe


Severity Currently: Severe


Pain Intensity: 10


Pain Scale Used: 0-10 Numeric


Location: Diffuse


Radiates: No


Character: Other: - unable to describe


Aggravating Factor(s): Movement, Deep Breaths, Other: - coughing


Alleviating Factor(s): Nothing


Associated Signs And Symptoms: Positive: Nausea, Vomiting.  Negative: Fever, 

Chest Pain, Back Pain, Urinary Symptoms





- Allergies/Home Medications


Allergies/Adverse Reactions: 


 Allergies











Allergy/AdvReac Type Severity Reaction Status Date / Time


 


Penicillins Allergy  Anaphylatic Verified 02/13/19 20:53





   Shock  














PMH/Surg Hx/FS Hx/Imm Hx


Endocrine/Hematology History: 


   Denies: Hx Diabetes


Cardiovascular History: 


   Denies: Hx Congestive Heart Failure, Hx Hypertension


Respiratory History: Reports: Hx Asthma - childhood


 History: 


   Denies: Hx Renal Disease





- Surgical History


Surgery Procedure, Year, and Place: tubes in ears; testicular surgery


Infectious Disease History: No


Infectious Disease History: 


   Denies: History Other Infectious Disease, Traveled Outside the [place] in Last 30 

Days





- Family History


Known Family History: 


   Negative: Cardiac Disease, Hypertension





- Social History


Alcohol Use: Rare


Hx Substance Use: No


Substance Use Type: Reports: None


Hx Tobacco Use: No


Smoking Status (MU): Never Smoked Tobacco





Review of Systems


Negative: Fever


Negative: Chest Pain


Negative: Shortness Of Breath


Positive: Abdominal Pain, Vomiting, Nausea


Negative: dysuria, hematuria


Negative: Myalgia


All Other Systems Reviewed And Are Negative: Yes





Physical Exam





- Summary


Physical Exam Summary: 





Constitutional: Well-developed, Well-nourished, Alert. (-) Distressed


Skin: Warm, Dry


HENT: Normocephalic; Atraumatic


Eyes: Conjunctiva normal


Neck: Musculoskeletal ROM normal neck. (-) JVD, (-) Stridor, (-) Tracheal 

deviation


Cardio: Rhythm regular, rate normal, Heart sounds normal; Intact distal pulses; 

The pedal pulses are 2+ and symmetric. Radial pulses are 2+ and symmetric. (-) 

Murmur


Pulmonary/Chest wall: Effort normal. (-) Respiratory distress, (-) Wheezes, (-) 

Rales


Abd: Soft, (+) LLQ tenderness, (-) Distension, (-) Guarding, (-) Rebound


Musculoskeletal: (-) Edema


Lymph: (-) Cervical adenopathy


Neuro: Alert, Oriented x3


Psych: Mood and affect Normal


Triage Information Reviewed: Yes


Vital Signs On Initial Exam: 


 Initial Vitals











Temp Pulse Resp BP Pulse Ox


 


 98.1 F   100   20   165/77   98 


 


 08/07/19 02:50  08/07/19 02:50  08/07/19 02:50  08/07/19 02:50  08/07/19 02:50











Vital Signs Reviewed: Yes





Diagnostics





- Vital Signs


 Vital Signs











  Temp Pulse Resp BP Pulse Ox


 


 08/07/19 02:50  98.1 F  100  20  165/77  98














- Laboratory


Result Diagrams: 


 08/07/19 03:44





 08/07/19 03:44


Lab Statement: Any lab studies that have been ordered have been reviewed, and 

results considered in the medical decision making process.





- CT


  ** abdomen/pelvis


CT Interpretation Completed By: Radiologist


Summary of CT Findings: 1. No evidence for diverticulitis.  2. Mild 

hepatomegaly.  3. Old granulomatous disease.  ED physician has reviewed this 

imaging report.





Re-Evaluation





- Re-Evaluation


  ** First Eval


Re-Evaluation Time: 06:44





Abdominal Pain Male Course/Dx





- Course


Course Of Treatment: A 30 y/o male presents to [facility] with a chief complaint of 

abdominal pain for the past three days. The physical exam revealed LLQ 

tenderness. Blood work, chemistries and urines obtained and are WNL. Abdomen/

pelvis CT impression: 1. No evidence for diverticulitis.  2. Mild hepatomegaly.

  3. Old granulomatous disease. The patient will be discharged home. He is 

agreeable with this plan.





- Diagnoses


Provider Diagnoses: 


 Abdominal pain








Discharge





- Sign-Out/Discharge


Documenting (check all that apply): Patient Departure - DC


Patient Received Moderate/Deep Sedation with Procedure: No





- Discharge Plan


Condition: Stable


Disposition: HOME


Prescriptions: 


Dicyclomine CAP* [Bentyl CAP*] 10 mg PO TID PRN #20 cap


 PRN Reason: Pain - Mild


Patient Education Materials:  Acute Abdominal Pain (ED)


Print Language: ENGLISH


Forms:  *Work Release


Referrals: 


Jamee Simmons PA [Primary Care Provider] - 





- Billing Disposition and Condition


Condition: STABLE


Disposition: Home





- Attestation Statements


Document Initiated by Scribe: Yes


Documenting Scribe: Lit Kothari


Provider For Whom Scribe is Documenting (Include Credential): Felisha Dumont MD


Scribe Attestation: 


Lit WAGNER, scribed for Felisha Fatima MD on 08/08/19 at 

0745. 


Scribe Documentation Reviewed: Yes


Provider Attestation: 


The documentation as recorded by the Lit adorno accurately 

reflects the service I personally performed and the decisions made by me, 

Felisha Fatima MD


Status of Scribe Document: Viewed

## 2019-09-19 ENCOUNTER — HOSPITAL ENCOUNTER (EMERGENCY)
Dept: HOSPITAL 25 - UCEAST | Age: 29
Discharge: HOME | End: 2019-09-19
Payer: MEDICAID

## 2019-09-19 VITALS — SYSTOLIC BLOOD PRESSURE: 141 MMHG | DIASTOLIC BLOOD PRESSURE: 77 MMHG
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
## 2019-09-19 DIAGNOSIS — S50.02XA: Primary | ICD-10-CM

## 2019-09-19 PROCEDURE — G0463 HOSPITAL OUTPT CLINIC VISIT: HCPCS

## 2019-09-19 PROCEDURE — 99211 OFF/OP EST MAY X REQ PHY/QHP: CPT

## 2019-09-19 NOTE — UC
Elbow Pain





- HPI Summary


HPI Summary: 





29-year-old male who was helping his father do some construction when the 

father was using a rubber mallet and it slipped out of the father's hand 

hitting the patient in the left elbow.  Patient states he has mild swelling to 

the left elbow.





- History of Current Complaint


Chief Complaint: UCUpperExtremity


Stated Complaint: ELBOW INJURY FROM SLEDGEHAMMER


Time Seen by Provider: 09/19/19 16:29


Hx Obtained From: Patient


Onset/Duration: Hours


Severity Initially: Mild


Severity Currently: Mild


Pain Intensity: 3


Character: Dull, Aching


Aggravating Factor(s): Movement


Alleviating Factor(s): Rest


Associated Signs And Symptoms: Positive: Swelling - Minimal swelling noted to 

the lateral left elbow.





- Allergies/Home Medications


Allergies/Adverse Reactions: 


 Allergies











Allergy/AdvReac Type Severity Reaction Status Date / Time


 


Penicillins Allergy  Anaphylatic Verified 09/19/19 16:15





   Shock  











Home Medications: 


 Home Medications





NK [No Home Medications Reported]  09/19/19 [History Confirmed 09/19/19]











PMH/Surg Hx/FS Hx/Imm Hx


Previously Healthy: Yes


Other History Of: 


   Negative For: HIV, Hepatitis B, Hepatitis C





- Surgical History


Surgical History: Yes


Surgery Procedure, Year, and Place: tubes in ears; testicular surgery





- Family History


Known Family History: 


   Negative: Cardiac Disease, Hypertension





- Social History


Alcohol Use: None


Substance Use Type: None


Smoking Status (MU): Never Smoked Tobacco


Household Exposure Type: Cigarettes





- Immunization History


Most Recent Influenza Vaccination: 2015


Most Recent Tetanus Shot: UTD





Review of Systems


All Other Systems Reviewed And Are Negative: Yes


Motor: Positive: Negative


Neurovascular: Positive: Negative


Musculoskeletal: Positive: Other: - Mild pain on palpation to the lateral elbow 

with minimal swelling present.  No deformity.  Full range of motion.


Neurological: Positive: Negative


Psychological: Positive: Negative


Is Patient Immunocompromised?: No





Physical Exam


Triage Information Reviewed: Yes


Appearance: Well-Appearing, No Pain Distress, Well-Nourished


Vital Signs: 


 Initial Vital Signs











Temp  98.1 F   09/19/19 16:11


 


Pulse  79   09/19/19 16:11


 


Resp  16   09/19/19 16:11


 


BP  141/77   09/19/19 16:11


 


Pulse Ox  98   09/19/19 16:11











Vital Signs Reviewed: Yes


Musculoskeletal: Positive: Strength Intact, ROM Intact, Other: - Mild pain on 

palpation to the lateral elbow with minimal swelling present.  No deformity.  

Full range of motion.


Neurological Exam: Normal


Psychological Exam: Normal


Skin Exam: Normal





Elbow Pain Course/Dx





- Course


Course Of Treatment: 





Left elbow x-ray:Indication: Left elbow pain. 4 views of left elbow 

demonstrates no fracture. No joint effusion is noted. No other bone or joint 

abnormalities identified. IMPRESSION: Unremarkable left elbow.





- Differential Dx/Diagnosis


Provider Diagnosis: 


 Contusion of left elbow








Discharge ED





- Sign-Out/Discharge


Documenting (check all that apply): Patient Departure


All imaging exams completed and their final reports reviewed: Yes





- Discharge Plan


Condition: Good


Disposition: HOME


Patient Education Materials:  Contusion in Adults (ED)


Referrals: 


Jamee Simmons PA [Primary Care Provider] - 


Additional Instructions: 


Apply ice intermittently to the sore area, may take Tylenol or Motrin for pain.

  Follow-up with your primary care provider or orthopedist if continued pain 

over the next 4 or 5 days or worsening symptoms.





- Billing Disposition and Condition


Condition: GOOD


Disposition: Home





- Attestation Statements


Provider Attestation: 





I was available for consult. This patient was seen by the CONNER. The patient was 

not presented to, seen by, or examined by me. -Shashi

## 2020-02-05 ENCOUNTER — HOSPITAL ENCOUNTER (EMERGENCY)
Dept: HOSPITAL 25 - UCEAST | Age: 30
Discharge: HOME | End: 2020-02-05
Payer: COMMERCIAL

## 2020-02-05 VITALS — DIASTOLIC BLOOD PRESSURE: 92 MMHG | SYSTOLIC BLOOD PRESSURE: 136 MMHG

## 2020-02-05 DIAGNOSIS — R09.89: ICD-10-CM

## 2020-02-05 DIAGNOSIS — B34.9: Primary | ICD-10-CM

## 2020-02-05 DIAGNOSIS — R51: ICD-10-CM

## 2020-02-05 DIAGNOSIS — Z88.0: ICD-10-CM

## 2020-02-05 DIAGNOSIS — R05: ICD-10-CM

## 2020-02-05 DIAGNOSIS — Z87.891: ICD-10-CM

## 2020-02-05 PROCEDURE — G0463 HOSPITAL OUTPT CLINIC VISIT: HCPCS

## 2020-02-05 PROCEDURE — 99211 OFF/OP EST MAY X REQ PHY/QHP: CPT

## 2020-02-05 NOTE — UC
General HPI





- HPI Summary


HPI Summary: 





States he has had a frontal headache for the past 2 days.  Congested with clear 

runny nose, cough.  Tmax on first day was 99.4   Good PO. Works as manager in 

hotel.  Taking Dayquil and Nyquil with minimal relief 


NO N/V/D 


MEds: Reviewed 





- History of Current Complaint


Chief Complaint: UCGeneralIllness


Stated Complaint: SINUS ISSUE


Time Seen by Provider: 02/05/20 09:53


Pain Intensity: 8





- Allergy/Home Medications


Allergies/Adverse Reactions: 


 Allergies











Allergy/AdvReac Type Severity Reaction Status Date / Time


 


Penicillins Allergy  Anaphylatic Verified 10/25/19 10:04





   Shock  














PMH/Surg Hx/FS Hx/Imm Hx


Previously Healthy: Yes


Other History Of: 


   Negative For: HIV, Hepatitis B, Hepatitis C





- Surgical History


Surgical History: Yes


Surgery Procedure, Year, and Place: tubes in ears;.  testicular surgery -

STITCHED IN PLACE





- Family History


Known Family History: 


   Negative: Cardiac Disease, Hypertension





- Social History


Alcohol Use: None


Substance Use Type: None


Smoking Status (MU): Former Smoker


Household Exposure Type: Cigarettes





- Immunization History


Most Recent Influenza Vaccination: 2015


Most Recent Tetanus Shot: UTD





Review of Systems


All Other Systems Reviewed And Are Negative: Yes


Constitutional: Positive: Negative


ENT: Positive: Nasal Discharge, Sinus Congestion, Sinus Pain/Tenderness


Respiratory: Positive: Cough





Physical Exam


Triage Information Reviewed: Yes


Appearance: Well-Appearing


Vital Signs: 


 Initial Vital Signs











Temp  98.2 F   02/05/20 08:50


 


Pulse  108   02/05/20 08:50


 


Resp  18   02/05/20 08:50


 


BP  136/92   02/05/20 08:50


 


Pulse Ox  98   02/05/20 08:50











Vital Signs Reviewed: Yes


ENT: Positive: Pharyngeal erythema, Other - frontal headache tenderness, no 

maxillary tenderness Clear fluid TM's b/l


Neck: Positive: Supple, Nontender


Respiratory: Positive: Lungs clear, Normal breath sounds


Cardiovascular: Positive: RRR, No Murmur





Course/Dx





- Course


Course Of Treatment: 





This is a 29 yr old with 3 days of sinus headache, and congestion 





Nontoxic appearing 





Plan


Recommend sudafed for congestion 


Ibuprofen as needed as directed for headache 


Would start Afrin nasal spray for 2-3 days as directed 





Stop Dayquil and and Nyquil 





If symptoms persist or worsen, recommend follow up with PCP or return to urgent 

care





- Diagnoses


Provider Diagnosis: 


 Sinus headache, Viral syndrome








Discharge ED





- Sign-Out/Discharge


Documenting (check all that apply): Patient Departure


All imaging exams completed and their final reports reviewed: No Studies





- Discharge Plan


Condition: Good


Disposition: HOME


Patient Education Materials:  Rhinosinusitis (DC)


Forms:  *Work Release


Referrals: 


Jamee Simmons PA [Primary Care Provider] - 


Additional Instructions: 


Recommend sudafed for congestion 


Ibuprofen as needed as directed for headache 


Would start Afrin nasal spray for 2-3 days as directed 





Stop Dayquil and and Nyquil 





If symptoms persist or worsen, recommend follow up with PCP or return to urgent 

care 





- Billing Disposition and Condition


Condition: GOOD


Disposition: Home

## 2020-04-21 ENCOUNTER — HISTORICAL (OUTPATIENT)
Dept: LAB | Facility: HOSPITAL | Age: 30
End: 2020-04-21

## 2020-04-25 ENCOUNTER — HISTORICAL (OUTPATIENT)
Dept: ADMINISTRATIVE | Facility: HOSPITAL | Age: 30
End: 2020-04-25

## 2020-10-28 ENCOUNTER — HISTORICAL (OUTPATIENT)
Dept: ADMINISTRATIVE | Facility: HOSPITAL | Age: 30
End: 2020-10-28

## 2020-10-29 ENCOUNTER — HISTORICAL (OUTPATIENT)
Dept: ADMINISTRATIVE | Facility: HOSPITAL | Age: 30
End: 2020-10-29

## 2022-04-09 ENCOUNTER — HISTORICAL (OUTPATIENT)
Dept: ADMINISTRATIVE | Facility: HOSPITAL | Age: 32
End: 2022-04-09

## 2022-04-25 VITALS
OXYGEN SATURATION: 100 % | SYSTOLIC BLOOD PRESSURE: 124 MMHG | WEIGHT: 231.25 LBS | BODY MASS INDEX: 34.25 KG/M2 | DIASTOLIC BLOOD PRESSURE: 88 MMHG | HEIGHT: 69 IN

## 2022-05-02 NOTE — HISTORICAL [facility]
This is a historical note converted from Dorian. Formatting and pictures may have been removed.  Please reference Dorian for original formatting and attached multimedia. Chief Complaint  constipation X 1 month  History of Present Illness  30 yo autistic male patient presents with c/o constipation x 1 month.? Caregiver states they tried giving Magnesium citrate yesterday and he has had no results. He takes Lubiprostone daily.? Denies n/v.? No suppositories, enemas,?or MOM have been administered.  Review of Systems  See HPI for details  ?   Constitutional: Denies Change in appetite. Denies Chills. Denies?Fever. Denies Night sweats.  Cardiovascular: Denies Chest pain at rest. Denies Chest pain with exertion. Denies Irregular heartbeat. Denies Palpitations.  Gastrointestinal: Denies Abdominal pain. Denies Diarrhea. Denies Nausea. Denies Vomiting. Denies Hematemesis or Hematochezia. +Constipation  Genitourinary: Denies Dysuria. Denies Urinary frequency. Denies Urinary urgency. Denies Blood in urine.  Integumentary: Denies Rash. Denies Itching. Denies Dry skin.  Neurologic: Denies Dizziness. Denies Fainting. Denies Headache.  Psychiatric: Denies Depression. Denies Anxiety. Denies Suicidal/Homicidal ideations.  ?   All Other ROS: Negative except as stated in HPI.  Physical Exam  Vitals & Measurements  T:?37.2? ?C (Oral)? HR:?125(Peripheral)? RR:?18? BP:?125/83? SpO2:?97%?  HT:?174?cm? WT:?98.8?kg?  General:?AAOx3, no inc anxiety  Skin: PWD, no diaphoresis, cyanosis, or pallor  Abd:? flat without distension. No surface trauma, scars, incisions.? Bowel sounds are present in all four quadrants.?NT?to palpation.?NT?in epigastric area.?No organomegaly. Negative Nottingham sign.? No periumbilical tenderness.? No rebound in lower quadrants.? NT over McBurneys point. No suprapubic tenderness or distension.?Good?femoral pulses bilaterally.? No hernia noted.? No CVAT.??  Digital Rectal Examwith no impaction felt, minimal amount of hard  stool at rectal vault palpated.?Sphincter with good tone, no masses or nodules felt, no stool to glove when removed. Unable to palpate prostate.  Resp: CTAB, no wheezing, rhonchi, rales  CV: S1, S2 present. no m/r/g. Peripheral pulses intact and 2+  Assessment/Plan  1.?Constipation?K59.00  XR abdomen?= constipation, no free air or obstruction  Increase fluid intake daily  Fleets Enema today x1, monitor for results.  Can add Metamucil to his diet for increased fiber  Milk of Magnesia 60cc by mouth with 8 ounces of water after.  Miralax OTC 17gm daily and monitor stools daily, d/c if diarrhea develops.  If symptoms do not resolve or abdominal pain worsens, go to ER immediately.  Ordered:  Office/Outpatient Visit Level 3 New [id number] PC, Constipation  Autistic disorder, 04/25/20 16:13:00 CDT  ?  2.?Autistic disorder?F84.0  Continue current regimen.  Ordered:  Office/Outpatient Visit Level 3 New [id number] PC, Constipation  Autistic disorder, 04/25/20 16:13:00 CDT  ?   Problem List/Past Medical History  Ongoing  No chronic problems  Historical  Autistic disorder  Constipation  Hypothyroid  Procedure/Surgical History  denies   Medications  Amitiza 24 mcg oral capsule, 24 mcg= 1 cap(s), Oral, BID  chlorproMAZINE 100 mg oral tablet, 50 mg= 0.5 tab(s), Oral, BID  chlorproMAZINE 50 mg oral tablet, 50 mg= 1 tab(s), Oral, TID  clonazePAM 1 mg oral tablet, 1 mg= 1 tab(s), Oral, qPM  Depakote 500 mg oral delayed release (EC) tablet, 500 mg= 1 tab(s), Oral, TID  KlonoPIN 1 mg oral tablet, 1 mg= 1 tab(s), Oral, qPM,? ?Not Taking, Completed Rx  levothyroxine 50 mcg (0.05 mg) oral tablet, 50 mcg= 1 tab(s), Oral, Daily  Seroquel  mg oral tablet, extended release, 400 mg= 1 tab(s), Oral, BID  Singulair 10 mg oral TABLET, 10 mg= 1 tab(s), Oral, Daily  Allergies  No active allergies  Social History  Abuse/Neglect  No, 04/25/2020  Tobacco  Never (less than 100 in lifetime), No, 04/25/2020  Family History  Father: History is  unknown  Mother: History is unknown  Health Maintenance  Health Maintenance  ???Pending?(in the next year)  ??? ??OverDue  ??? ? ? ?Alcohol Misuse Screening due??01/01/20??and every 1??year(s)  ??? ? ? ?Obesity Screening due??01/01/20??and every 1??year(s)  ??? ??Due?  ??? ? ? ?Tetanus Vaccine due??04/25/20??and every 10??year(s)  ???Satisfied?(in the past 1 year)  ??? ??Satisfied?  ??? ? ? ?ADL Screening on??04/25/20.??Satisfied by Bishnu Boogie LPN  ??? ? ? ?Blood Pressure Screening on??04/25/20.??Satisfied by Bishnu Boogie LPN  ?  Diagnostic Results  (04/25/2020 15:54 CDT XR Abdomen Flat and Erect)  * Final Report *  ?  Reason For Exam  constipated x 1 month; r/o bowel obstruction;Constipation  ?  Radiology Report  EXAMINATION  XR Abdomen Flat and Erect  ?  INDICATION  Constipation  ?  Comparison: None available  ?  FINDINGS  Lines/tubes/devices:  None present.  ?  Notable amount of residual stool is present throughout the colon.  There is no evidence of free intraperitoneal air beneath the  hemidiaphragms. No abnormal air-fluid levels or of the findings to  suggest high-grade small bowel obstruction are identified. No  intra-abdominal mass effect is evident.  ?  The visualized lung bases and cardiac contour are unremarkable.  Included osseous structures are without acute abnormality.  ?  IMPRESSION  1. ?No evidence of acute or focal intra-abdominal abnormality.  2. ?Findings in keeping with constipation. [1]     [1]?XR Abdomen Flat and Erect; Bishop Huynh MD 04/25/2020 15:54 CDT

## 2023-07-10 ENCOUNTER — LAB VISIT (OUTPATIENT)
Dept: LAB | Facility: HOSPITAL | Age: 33
End: 2023-07-10
Attending: NURSE PRACTITIONER
Payer: MEDICAID

## 2023-07-10 DIAGNOSIS — Z79.899 ENCOUNTER FOR LONG-TERM (CURRENT) USE OF OTHER MEDICATIONS: Primary | ICD-10-CM

## 2023-07-10 LAB
ALBUMIN SERPL-MCNC: 4.2 G/DL (ref 3.5–5)
ALP SERPL-CCNC: 29 UNIT/L (ref 40–150)
ALT SERPL-CCNC: 22 UNIT/L (ref 0–55)
AST SERPL-CCNC: 22 UNIT/L (ref 5–34)
BASOPHILS # BLD AUTO: 0.02 X10(3)/MCL
BASOPHILS NFR BLD AUTO: 0.4 %
BILIRUBIN DIRECT+TOT PNL SERPL-MCNC: 0.1 MG/DL (ref 0–?)
BILIRUBIN DIRECT+TOT PNL SERPL-MCNC: 0.3 MG/DL (ref 0–0.8)
BILIRUBIN DIRECT+TOT PNL SERPL-MCNC: 0.4 MG/DL
CHOLEST SERPL-MCNC: 223 MG/DL
CHOLEST/HDLC SERPL: 7 {RATIO} (ref 0–5)
EOSINOPHIL # BLD AUTO: 0.06 X10(3)/MCL (ref 0–0.9)
EOSINOPHIL NFR BLD AUTO: 1.1 %
ERYTHROCYTE [DISTWIDTH] IN BLOOD BY AUTOMATED COUNT: 12.7 % (ref 11.5–17)
HCT VFR BLD AUTO: 41 % (ref 42–52)
HDLC SERPL-MCNC: 33 MG/DL (ref 35–60)
HGB BLD-MCNC: 13.2 G/DL (ref 14–18)
IMM GRANULOCYTES # BLD AUTO: 0.05 X10(3)/MCL (ref 0–0.04)
IMM GRANULOCYTES NFR BLD AUTO: 0.9 %
LDLC SERPL CALC-MCNC: 135 MG/DL (ref 50–140)
LYMPHOCYTES # BLD AUTO: 2.32 X10(3)/MCL (ref 0.6–4.6)
LYMPHOCYTES NFR BLD AUTO: 41.9 %
MCH RBC QN AUTO: 28 PG (ref 27–31)
MCHC RBC AUTO-ENTMCNC: 32.2 G/DL (ref 33–36)
MCV RBC AUTO: 87 FL (ref 80–94)
MONOCYTES # BLD AUTO: 0.79 X10(3)/MCL (ref 0.1–1.3)
MONOCYTES NFR BLD AUTO: 14.3 %
NEUTROPHILS # BLD AUTO: 2.3 X10(3)/MCL (ref 2.1–9.2)
NEUTROPHILS NFR BLD AUTO: 41.4 %
NRBC BLD AUTO-RTO: 0 %
PLATELET # BLD AUTO: 222 X10(3)/MCL (ref 130–400)
PMV BLD AUTO: 9.1 FL (ref 7.4–10.4)
PROLACTIN LEVEL (OHS): 7.59 NG/ML (ref 3.46–19.4)
PROT SERPL-MCNC: 7.8 GM/DL (ref 6.4–8.3)
RBC # BLD AUTO: 4.71 X10(6)/MCL (ref 4.7–6.1)
TRIGL SERPL-MCNC: 273 MG/DL (ref 34–140)
VLDLC SERPL CALC-MCNC: 55 MG/DL
WBC # SPEC AUTO: 5.54 X10(3)/MCL (ref 4.5–11.5)

## 2023-07-10 PROCEDURE — 85025 COMPLETE CBC W/AUTO DIFF WBC: CPT

## 2023-07-10 PROCEDURE — 84146 ASSAY OF PROLACTIN: CPT

## 2023-07-10 PROCEDURE — 80076 HEPATIC FUNCTION PANEL: CPT

## 2023-07-10 PROCEDURE — 36415 COLL VENOUS BLD VENIPUNCTURE: CPT

## 2023-07-10 PROCEDURE — 80061 LIPID PANEL: CPT

## 2023-07-11 LAB — PATH REV: NORMAL

## 2023-10-31 ENCOUNTER — LAB VISIT (OUTPATIENT)
Dept: LAB | Facility: HOSPITAL | Age: 33
End: 2023-10-31
Attending: NURSE PRACTITIONER
Payer: MEDICAID

## 2023-10-31 DIAGNOSIS — Z79.899 ENCOUNTER FOR LONG-TERM (CURRENT) USE OF OTHER MEDICATIONS: Primary | ICD-10-CM

## 2023-10-31 LAB
ALBUMIN SERPL-MCNC: 3.9 G/DL (ref 3.5–5)
ALP SERPL-CCNC: 25 UNIT/L (ref 40–150)
ALT SERPL-CCNC: 12 UNIT/L (ref 0–55)
AST SERPL-CCNC: 15 UNIT/L (ref 5–34)
BASOPHILS # BLD AUTO: 0.01 X10(3)/MCL
BASOPHILS NFR BLD AUTO: 0.1 %
BILIRUB SERPL-MCNC: 0.3 MG/DL
BILIRUBIN DIRECT+TOT PNL SERPL-MCNC: 0.1 MG/DL (ref 0–?)
BILIRUBIN DIRECT+TOT PNL SERPL-MCNC: 0.2 MG/DL (ref 0–0.8)
EOSINOPHIL # BLD AUTO: 0.03 X10(3)/MCL (ref 0–0.9)
EOSINOPHIL NFR BLD AUTO: 0.4 %
ERYTHROCYTE [DISTWIDTH] IN BLOOD BY AUTOMATED COUNT: 12.6 % (ref 11.5–17)
HCT VFR BLD AUTO: 36.5 % (ref 42–52)
HGB BLD-MCNC: 12.2 G/DL (ref 14–18)
IMM GRANULOCYTES # BLD AUTO: 0.06 X10(3)/MCL (ref 0–0.04)
IMM GRANULOCYTES NFR BLD AUTO: 0.8 %
LYMPHOCYTES # BLD AUTO: 2.66 X10(3)/MCL (ref 0.6–4.6)
LYMPHOCYTES NFR BLD AUTO: 33.9 %
MCH RBC QN AUTO: 28 PG (ref 27–31)
MCHC RBC AUTO-ENTMCNC: 33.4 G/DL (ref 33–36)
MCV RBC AUTO: 83.9 FL (ref 80–94)
MONOCYTES # BLD AUTO: 1.06 X10(3)/MCL (ref 0.1–1.3)
MONOCYTES NFR BLD AUTO: 13.5 %
NEUTROPHILS # BLD AUTO: 4.03 X10(3)/MCL (ref 2.1–9.2)
NEUTROPHILS NFR BLD AUTO: 51.3 %
NRBC BLD AUTO-RTO: 0 %
PATH REV: NORMAL
PLATELET # BLD AUTO: 175 X10(3)/MCL (ref 130–400)
PMV BLD AUTO: 8.9 FL (ref 7.4–10.4)
PROLACTIN LEVEL (OHS): 11.67 NG/ML (ref 3.46–19.4)
PROT SERPL-MCNC: 7.3 GM/DL (ref 6.4–8.3)
RBC # BLD AUTO: 4.35 X10(6)/MCL (ref 4.7–6.1)
VALPROATE SERPL-MCNC: 117 UG/ML (ref 50–100)
WBC # SPEC AUTO: 7.85 X10(3)/MCL (ref 4.5–11.5)

## 2023-10-31 PROCEDURE — 85025 COMPLETE CBC W/AUTO DIFF WBC: CPT

## 2023-10-31 PROCEDURE — 84146 ASSAY OF PROLACTIN: CPT

## 2023-10-31 PROCEDURE — 80164 ASSAY DIPROPYLACETIC ACD TOT: CPT

## 2023-10-31 PROCEDURE — 80076 HEPATIC FUNCTION PANEL: CPT

## 2023-10-31 PROCEDURE — 36415 COLL VENOUS BLD VENIPUNCTURE: CPT

## 2024-02-20 ENCOUNTER — LAB VISIT (OUTPATIENT)
Dept: LAB | Facility: HOSPITAL | Age: 34
End: 2024-02-20
Attending: NURSE PRACTITIONER
Payer: MEDICAID

## 2024-02-20 DIAGNOSIS — Z79.899 ENCOUNTER FOR LONG-TERM (CURRENT) USE OF OTHER MEDICATIONS: Primary | ICD-10-CM

## 2024-02-20 LAB
ALBUMIN SERPL-MCNC: 3.8 G/DL (ref 3.5–5)
ALP SERPL-CCNC: 25 UNIT/L (ref 40–150)
ALT SERPL-CCNC: 19 UNIT/L (ref 0–55)
AST SERPL-CCNC: 26 UNIT/L (ref 5–34)
BASOPHILS # BLD AUTO: 0.01 X10(3)/MCL
BASOPHILS NFR BLD AUTO: 0.2 %
BILIRUB SERPL-MCNC: 0.4 MG/DL
BILIRUBIN DIRECT+TOT PNL SERPL-MCNC: 0.1 MG/DL (ref 0–?)
BILIRUBIN DIRECT+TOT PNL SERPL-MCNC: 0.3 MG/DL (ref 0–0.8)
EOSINOPHIL # BLD AUTO: 0.03 X10(3)/MCL (ref 0–0.9)
EOSINOPHIL NFR BLD AUTO: 0.6 %
ERYTHROCYTE [DISTWIDTH] IN BLOOD BY AUTOMATED COUNT: 13.2 % (ref 11.5–17)
HCT VFR BLD AUTO: 39.8 % (ref 42–52)
HGB BLD-MCNC: 12.9 G/DL (ref 14–18)
IMM GRANULOCYTES # BLD AUTO: 0.02 X10(3)/MCL (ref 0–0.04)
IMM GRANULOCYTES NFR BLD AUTO: 0.4 %
LYMPHOCYTES # BLD AUTO: 2.69 X10(3)/MCL (ref 0.6–4.6)
LYMPHOCYTES NFR BLD AUTO: 50.6 %
MCH RBC QN AUTO: 28.4 PG (ref 27–31)
MCHC RBC AUTO-ENTMCNC: 32.4 G/DL (ref 33–36)
MCV RBC AUTO: 87.7 FL (ref 80–94)
MONOCYTES # BLD AUTO: 0.78 X10(3)/MCL (ref 0.1–1.3)
MONOCYTES NFR BLD AUTO: 14.7 %
NEUTROPHILS # BLD AUTO: 1.79 X10(3)/MCL (ref 2.1–9.2)
NEUTROPHILS NFR BLD AUTO: 33.5 %
NRBC BLD AUTO-RTO: 0 %
PATH REV: NORMAL
PLATELET # BLD AUTO: 196 X10(3)/MCL (ref 130–400)
PMV BLD AUTO: 9.9 FL (ref 7.4–10.4)
PROT SERPL-MCNC: 7.5 GM/DL (ref 6.4–8.3)
RBC # BLD AUTO: 4.54 X10(6)/MCL (ref 4.7–6.1)
VALPROATE SERPL-MCNC: 100.6 UG/ML (ref 50–100)
WBC # SPEC AUTO: 5.32 X10(3)/MCL (ref 4.5–11.5)

## 2024-02-20 PROCEDURE — 80076 HEPATIC FUNCTION PANEL: CPT

## 2024-02-20 PROCEDURE — 80164 ASSAY DIPROPYLACETIC ACD TOT: CPT

## 2024-02-20 PROCEDURE — 85025 COMPLETE CBC W/AUTO DIFF WBC: CPT

## 2024-02-20 PROCEDURE — 36415 COLL VENOUS BLD VENIPUNCTURE: CPT

## 2024-08-13 ENCOUNTER — LAB VISIT (OUTPATIENT)
Dept: LAB | Facility: HOSPITAL | Age: 34
End: 2024-08-13
Attending: NURSE PRACTITIONER
Payer: MEDICAID

## 2024-08-13 DIAGNOSIS — Z79.899 ENCOUNTER FOR LONG-TERM (CURRENT) USE OF OTHER MEDICATIONS: Primary | ICD-10-CM

## 2024-08-13 LAB
ALBUMIN SERPL-MCNC: 3.7 G/DL (ref 3.5–5)
ALP SERPL-CCNC: 29 UNIT/L (ref 40–150)
ALT SERPL-CCNC: 22 UNIT/L (ref 0–55)
AST SERPL-CCNC: 20 UNIT/L (ref 5–34)
BASOPHILS # BLD AUTO: 0.01 X10(3)/MCL
BASOPHILS NFR BLD AUTO: 0.2 %
BILIRUB DIRECT SERPL-MCNC: 0.1 MG/DL (ref 0–?)
BILIRUB SERPL-MCNC: 0.4 MG/DL
BILIRUBIN DIRECT+TOT PNL SERPL-MCNC: 0.3 MG/DL (ref 0–0.8)
EOSINOPHIL # BLD AUTO: 0.04 X10(3)/MCL (ref 0–0.9)
EOSINOPHIL NFR BLD AUTO: 0.7 %
ERYTHROCYTE [DISTWIDTH] IN BLOOD BY AUTOMATED COUNT: 12.9 % (ref 11.5–17)
HCT VFR BLD AUTO: 37.3 % (ref 42–52)
HGB BLD-MCNC: 12.2 G/DL (ref 14–18)
IMM GRANULOCYTES # BLD AUTO: 0.06 X10(3)/MCL (ref 0–0.04)
IMM GRANULOCYTES NFR BLD AUTO: 1 %
LYMPHOCYTES # BLD AUTO: 3.17 X10(3)/MCL (ref 0.6–4.6)
LYMPHOCYTES NFR BLD AUTO: 53.1 %
MCH RBC QN AUTO: 28.6 PG (ref 27–31)
MCHC RBC AUTO-ENTMCNC: 32.7 G/DL (ref 33–36)
MCV RBC AUTO: 87.4 FL (ref 80–94)
MONOCYTES # BLD AUTO: 0.65 X10(3)/MCL (ref 0.1–1.3)
MONOCYTES NFR BLD AUTO: 10.9 %
NEUTROPHILS # BLD AUTO: 2.04 X10(3)/MCL (ref 2.1–9.2)
NEUTROPHILS NFR BLD AUTO: 34.1 %
NRBC BLD AUTO-RTO: 0 %
PLATELET # BLD AUTO: 203 X10(3)/MCL (ref 130–400)
PLATELETS.RETICULATED NFR BLD AUTO: 2.7 % (ref 0.9–11.2)
PMV BLD AUTO: 8.9 FL (ref 7.4–10.4)
PROT SERPL-MCNC: 7.5 GM/DL (ref 6.4–8.3)
RBC # BLD AUTO: 4.27 X10(6)/MCL (ref 4.7–6.1)
VALPROATE SERPL-MCNC: 93.2 UG/ML (ref 50–100)
WBC # BLD AUTO: 5.97 X10(3)/MCL (ref 4.5–11.5)

## 2024-08-13 PROCEDURE — 80164 ASSAY DIPROPYLACETIC ACD TOT: CPT

## 2024-08-13 PROCEDURE — 80076 HEPATIC FUNCTION PANEL: CPT

## 2024-08-13 PROCEDURE — 36415 COLL VENOUS BLD VENIPUNCTURE: CPT

## 2024-08-13 PROCEDURE — 85025 COMPLETE CBC W/AUTO DIFF WBC: CPT

## 2024-08-14 LAB — PATH REV: NORMAL

## 2025-04-02 ENCOUNTER — LAB VISIT (OUTPATIENT)
Dept: LAB | Facility: HOSPITAL | Age: 35
End: 2025-04-02
Attending: NURSE PRACTITIONER
Payer: MEDICAID

## 2025-04-02 DIAGNOSIS — Z79.899 POLYPHARMACY: Primary | ICD-10-CM

## 2025-04-02 LAB
ALBUMIN SERPL-MCNC: 3.8 G/DL (ref 3.5–5)
ALP SERPL-CCNC: 32 UNIT/L (ref 40–150)
ALT SERPL-CCNC: 20 UNIT/L (ref 0–55)
AST SERPL-CCNC: 14 UNIT/L (ref 11–45)
BASOPHILS # BLD AUTO: 0.02 X10(3)/MCL
BASOPHILS NFR BLD AUTO: 0.3 %
BILIRUB DIRECT SERPL-MCNC: <0.1 MG/DL (ref 0–?)
BILIRUB INDIRECT SERPL-MCNC: >0.2 MG/DL (ref 0–0.8)
BILIRUB SERPL-MCNC: 0.3 MG/DL
EOSINOPHIL # BLD AUTO: 0.05 X10(3)/MCL (ref 0–0.9)
EOSINOPHIL NFR BLD AUTO: 0.6 %
ERYTHROCYTE [DISTWIDTH] IN BLOOD BY AUTOMATED COUNT: 13.6 % (ref 11.5–17)
HCT VFR BLD AUTO: 37.6 % (ref 42–52)
HGB BLD-MCNC: 12.1 G/DL (ref 14–18)
IMM GRANULOCYTES # BLD AUTO: 0.06 X10(3)/MCL (ref 0–0.04)
IMM GRANULOCYTES NFR BLD AUTO: 0.8 %
LYMPHOCYTES # BLD AUTO: 3.77 X10(3)/MCL (ref 0.6–4.6)
LYMPHOCYTES NFR BLD AUTO: 48.5 %
MCH RBC QN AUTO: 28.5 PG (ref 27–31)
MCHC RBC AUTO-ENTMCNC: 32.2 G/DL (ref 33–36)
MCV RBC AUTO: 88.7 FL (ref 80–94)
MONOCYTES # BLD AUTO: 1.14 X10(3)/MCL (ref 0.1–1.3)
MONOCYTES NFR BLD AUTO: 14.7 %
NEUTROPHILS # BLD AUTO: 2.74 X10(3)/MCL (ref 2.1–9.2)
NEUTROPHILS NFR BLD AUTO: 35.1 %
NRBC BLD AUTO-RTO: 0 %
PATH REV: NORMAL
PLATELET # BLD AUTO: 235 X10(3)/MCL (ref 130–400)
PMV BLD AUTO: 8.8 FL (ref 7.4–10.4)
PROT SERPL-MCNC: 7.9 GM/DL (ref 6.4–8.3)
RBC # BLD AUTO: 4.24 X10(6)/MCL (ref 4.7–6.1)
VALPROATE SERPL-MCNC: 99.8 UG/ML (ref 50–100)
WBC # BLD AUTO: 7.78 X10(3)/MCL (ref 4.5–11.5)

## 2025-04-02 PROCEDURE — 80076 HEPATIC FUNCTION PANEL: CPT

## 2025-04-02 PROCEDURE — 36415 COLL VENOUS BLD VENIPUNCTURE: CPT

## 2025-04-02 PROCEDURE — 80164 ASSAY DIPROPYLACETIC ACD TOT: CPT

## 2025-04-02 PROCEDURE — 85025 COMPLETE CBC W/AUTO DIFF WBC: CPT
